# Patient Record
(demographics unavailable — no encounter records)

---

## 2025-05-21 NOTE — HISTORY OF PRESENT ILLNESS
[de-identified] : 18-year-old male with approximately 2 weeks of left shoulder pain.  Denies specific injuries.  He exercises frequently and does overhead weightlifting.  Reports superior shoulder pain and weakness.  The pain was severe last week, and he was seen at [facility] urgent care.  Toradol injection was performed and he was prescribed methocarbamol.  He reports pain improved.   he reports mild shoulder clicking.  He denies instability, injuries, or prior surgeries.

## 2025-05-21 NOTE — PHYSICAL EXAM
[de-identified] : General: No acute distress Mental: Alert and oriented x3 Eyes: Conjunctivitis not seen Chest: Symmetric chest rise, no audible wheezing Skin: Bilateral lower extremities absent from rashes and ulcers Abdomen: No distention  Left shoulder: Inspection: No swelling, ecchymosis or gross deformity. Skin: No masses, No lesions Tenderness: Tenderness at the distal clavicle.  No bicipital tenderness, no tenderness to the greater tuberosity/RTC insertion, no anterior shoulder/lesser tuberosity tenderness. No tenderness SC joint. ROM: Forward flexion 170, Abduction 150, External rotation 60, Internal rotation TL spine Impingement tests: Positive León, pain with resisted abduction AC Joint: no pain with cross arm testing Biceps: Negative speed Strength: 5/5 Forward flexion, abduction, external rotation, and internal rotation Neuro: AIN, PIN, Ulnar nerve motor intact Sensation: Intact to light touch in radial, median, ulnar, and axillary nerve distributions Vasc: 2+ radial pulse  [de-identified] : Left shoulder x-rays performed at urgent care on 5/15 reviewed in care stream.  There is appropriate alignment and maintained joint spaces, no fracture.

## 2025-05-21 NOTE — DISCUSSION/SUMMARY
[de-identified] : 18-year-old male with subacute left shoulder pain.  He has pain over the distal clavicle and has history of frequent overhead weight lifting.  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  X-rays do not show significant changes.  I discussed etiology management.  I recommended activity modification, stretching, avoidance of overhead weight lifting, ice or heat, topical or oral NSAIDs.  He does not need physical therapy at this time.  Recommend gradual return to activity.  He may follow-up as needed